# Patient Record
(demographics unavailable — no encounter records)

---

## 2018-01-10 RX ORDER — LEVOTHYROXINE SODIUM 150 UG/1
TABLET ORAL
Qty: 30 TABLET | Refills: 4 | Status: SHIPPED | OUTPATIENT
Start: 2018-01-10 | End: 2018-12-31 | Stop reason: SDUPTHER

## 2018-12-31 RX ORDER — LEVOTHYROXINE SODIUM 150 UG/1
TABLET ORAL
Qty: 30 TABLET | Refills: 0 | Status: SHIPPED | OUTPATIENT
Start: 2018-12-31

## 2019-01-02 RX ORDER — LEVOTHYROXINE SODIUM 150 UG/1
TABLET ORAL
Qty: 30 TABLET | Refills: 3 | OUTPATIENT
Start: 2019-01-02

## 2019-02-01 RX ORDER — LEVOTHYROXINE SODIUM 150 UG/1
TABLET ORAL
Qty: 30 TABLET | Refills: 0 | OUTPATIENT
Start: 2019-02-01

## 2019-02-05 RX ORDER — LEVOTHYROXINE SODIUM 150 UG/1
TABLET ORAL
Qty: 30 TABLET | Refills: 0 | OUTPATIENT
Start: 2019-02-05

## 2019-02-05 NOTE — TELEPHONE ENCOUNTER
----- Message from Layla Nielsen sent at 2/5/2019  1:03 PM CST -----  Contact: Patient's wife, Jany  Patient's wife is calling to let the office know that the pharmacy needs approval to refill patient's levothyroxine (SYNTHROID) 150 MCG tablet.  Please call to discuss.  Call Back#[phone]  Thanks     [id number] - [place], [place] - [id number] [facility] [address]  Phone: [phone] Fax: [phone]

## 2019-02-06 RX ORDER — LEVOTHYROXINE SODIUM 150 UG/1
150 TABLET ORAL DAILY
Qty: 10 TABLET | Refills: 0 | OUTPATIENT
Start: 2019-02-06

## 2019-02-06 RX ORDER — LEVOTHYROXINE SODIUM 150 UG/1
150 TABLET ORAL DAILY
Qty: 30 TABLET | Refills: 0 | OUTPATIENT
Start: 2019-02-06

## 2019-02-06 NOTE — TELEPHONE ENCOUNTER
"Patient states "I called the pharmacy and they told me that Dr. Porter refused my prescription for my thyroid. I have been out since Sunday and the next day that I have off is Sunday but y'all are closed and then Tuesday which I have an appointment. Can he just give me a few until I see him next week?" Verbalized to patient that it is very important that he shows up to his appointment on Tuesday. Patient verbalized understanding. Please advise.   "

## 2019-02-06 NOTE — TELEPHONE ENCOUNTER
----- Message from Will Barrera sent at 2/6/2019  3:07 PM CST -----  Contact: patient  Type: Needs Medical Advice    Who Called:  patient  Symptoms (please be specific):    How long has patient had these symptoms:    Pharmacy name and phone #:  [facility]  Best Call Back Number: [phone]  Additional Information: requesting a call back regarding thyroid medication,stated that he has been out since 02/03/19 would like a few tablets until upcoming visit

## 2019-02-07 RX ORDER — LEVOTHYROXINE SODIUM 150 UG/1
150 TABLET ORAL DAILY
Qty: 5 TABLET | Refills: 0 | OUTPATIENT
Start: 2019-02-07

## 2019-02-07 NOTE — TELEPHONE ENCOUNTER
Please just send in 5 thyroid pills for patient as he has an appointment on Tuesday and is completely out.
